# Patient Record
(demographics unavailable — no encounter records)

---

## 2019-07-11 NOTE — RAD
PA AND LATERAL VIEWS OF THE CHEST:

7/11/19

 

HISTORY: 

Cough.

 

FINDINGS: 

The heart size is normal.  The lungs are well expanded without focal areas of consolidation, pneumoth
oraces, or pleural effusions. No acute osseous abnormalities are seen.

 

IMPRESSION: 

No radiographic evidence of acute cardiopulmonary process. 

 

POS: [facility]

## 2020-03-07 NOTE — RAD
EXAM:

CHEST TWO VIEWS



3/7/2020 8:47 PM



HISTORY:

Cough and shortness of breath



COMPARISON:

July 11, 2019



FINDINGS:



Lungs:  No acute airspace consolidation.



Heart:  Normal in size and contour.



Pulmonary Vessels: Normal.



Costophrenic Angles: Clear.



Pneumothorax: None.



Osseous Structures:  Intact.



Additional Findings:   None.



IMPRESSION:

No significant acute intrathoracic disease.



Reported By: Baldo Washington 

Electronically Signed:  3/7/2020 8:47 PM